# Patient Record
Sex: MALE | Race: WHITE
[De-identification: names, ages, dates, MRNs, and addresses within clinical notes are randomized per-mention and may not be internally consistent; named-entity substitution may affect disease eponyms.]

---

## 2018-01-24 ENCOUNTER — HOSPITAL ENCOUNTER (EMERGENCY)
Dept: HOSPITAL 77 - KA.ED | Age: 78
Discharge: HOME | End: 2018-01-24
Payer: OTHER GOVERNMENT

## 2018-01-24 DIAGNOSIS — K59.00: ICD-10-CM

## 2018-01-24 DIAGNOSIS — N30.00: Primary | ICD-10-CM

## 2018-01-24 DIAGNOSIS — Z79.899: ICD-10-CM

## 2018-01-24 LAB
CHLORIDE SERPL-SCNC: 98 MMOL/L (ref 98–115)
SODIUM SERPL-SCNC: 139 MMOL/L (ref 136–145)

## 2018-01-24 PROCEDURE — 85025 COMPLETE CBC W/AUTO DIFF WBC: CPT

## 2018-01-24 PROCEDURE — 99284 EMERGENCY DEPT VISIT MOD MDM: CPT

## 2018-01-24 PROCEDURE — 36415 COLL VENOUS BLD VENIPUNCTURE: CPT

## 2018-01-24 PROCEDURE — 80053 COMPREHEN METABOLIC PANEL: CPT

## 2018-01-24 PROCEDURE — 83690 ASSAY OF LIPASE: CPT

## 2018-01-24 PROCEDURE — 74018 RADEX ABDOMEN 1 VIEW: CPT

## 2018-01-24 PROCEDURE — 81001 URINALYSIS AUTO W/SCOPE: CPT

## 2018-01-24 NOTE — EDM.PDOC
ED HPI GENERAL MEDICAL PROBLEM





- General


Chief Complaint: Abdominal Pain


Stated Complaint: ABDOMINAL PAIN


Time Seen by Provider: 01/24/18 17:35


Source of Information: Reports: Patient


History Limitations: Reports: No Limitations





- History of Present Illness


INITIAL COMMENTS - FREE TEXT/NARRATIVE: 





76 YO WM presents to ER complaining of lower abdominal and low back pain x 2 

weeks. Pt reports he has been having constipation and anorexia as well. Pt with 

PMH of lymphoma and bladder CA. Pt finished chemo for lymphoma 8/2017. Pt 

reports he has been followed closely by oncology. Pt has had CT abd/pelvis 

imaging in the recent past which showed no abnormalities. Pt denies fever/chills

, no nausea/vomiting. Pt reports passing gas and has had recent BM of hard 

stool without blood. Pt denies dysuria, frequency or urgency. 


Onset Date: 01/10/18


Duration: Week(s): (2)


Location: Reports: Abdomen, Back


Quality: Reports: Ache


Severity: Mild


Improves with: Reports: None


Worsens with: Reports: None


Associated Symptoms: Reports: No Other Symptoms.  Denies: Cough, Fever/Chills, 

Nausea/Vomiting, Shortness of Breath


  ** Lower Abdominal


Pain Score (Numeric/FACES): 10





- Related Data


 Allergies











Allergy/AdvReac Type Severity Reaction Status Date / Time


 


No Known Drug Allergies Allergy  Cannot Verified 01/24/18 17:21





   Remember  











Home Meds: 


 Home Meds





Aspirin [Children's Aspirin] 81 mg PO BEDTIME 01/24/18 [History]


Cephalexin [Keflex] 250 mg PO Q6H #40 cap 01/24/18 [Rx]


Phenazopyridine [Pyridium] 200 mg PO TID PRN #6 tablet 01/24/18 [Rx]


Polyethylene Glycol 3350 [MiraLAX] 17 gm PO DAILY #30 packet 01/24/18 [Rx]


Simvastatin [Zocor] 10 mg PO BEDTIME 01/24/18 [History]











ED ROS GENERAL





- Review of Systems


Review Of Systems: See Below


Constitutional: Reports: Weight Loss


HEENT: Reports: No Symptoms


Respiratory: Reports: No Symptoms


Cardiovascular: Reports: No Symptoms


Endocrine: Reports: No Symptoms


GI/Abdominal: Reports: Abdominal Pain, Constipation, Decreased Appetite, Flatus


: Reports: No Symptoms, Frequency


Musculoskeletal: Reports: No Symptoms


Skin: Reports: No Symptoms


Neurological: Reports: No Symptoms


Psychiatric: Reports: No Symptoms


Hematologic/Lymphatic: Reports: No Symptoms


Immunologic: Reports: No Symptoms





ED EXAM, GI/ABD





- Physical Exam


Exam: See Below


Exam Limited By: No Limitations


General Appearance: Alert, WD/WN, No Apparent Distress


Head: Atraumatic, Normocephalic


Neck: Normal Inspection, Supple, Non-Tender, Full Range of Motion


Respiratory/Chest: No Respiratory Distress, Lungs Clear, Normal Breath Sounds, 

No Accessory Muscle Use, Chest Non-Tender


Cardiovascular: Normal Peripheral Pulses, Regular Rate, Rhythm, No Edema, No 

Gallop, No JVD, No Murmur, No Rub


GI/Abdominal Exam: Normal Bowel Sounds, Soft, Non-Tender, No Organomegaly, No 

Distention, No Abnormal Bruit, No Mass, Pelvis Stable


Back Exam: Normal Inspection, Full Range of Motion, NT


Extremities: Normal Inspection, Normal Range of Motion, Non-Tender, Normal 

Capillary Refill, No Pedal Edema


Neurological: Alert, Oriented, CN II-XII Intact, Normal Cognition, Normal Gait, 

Normal Reflexes, No Motor/Sensory Deficits


Psychiatric: Normal Affect, Normal Mood


Skin Exam: Warm, Dry, Intact, Normal Color, No Rash


Lymphatic: No Adenopathy





Course





- Vital Signs


Last Recorded V/S: 


 Last Vital Signs











Temp  36.2 C   01/24/18 17:18


 


Pulse  106 H  01/24/18 17:18


 


Resp  20   01/24/18 17:18


 


BP  120/71   01/24/18 17:18


 


Pulse Ox  94 L  01/24/18 17:18














- Orders/Labs/Meds


Orders: 


 Active Orders 24 hr











 Category Date Time Status


 


 Abdomen 1V Flat [CR] Stat Exams  01/24/18 17:28 Ordered


 


 Sodium Chloride 0.9% [Normal Saline] 500 ml Med  01/24/18 17:30 Active





 IV .BOLUS   








 Medication Orders





Sodium Chloride (Normal Saline)  500 mls @ 999 mls/hr IV .BOLUS BANDAR








Labs: 


 Laboratory Tests











  01/24/18 01/24/18 01/24/18 Range/Units





  17:40 17:40 18:09 


 


WBC  6.1    (5.0-10.0)  10^3/uL


 


RBC  4.17 L    (4.50-6.00)  10^6/uL


 


Hgb  12.3 L    (13.0-17.0)  g/dL


 


Hct  39.8 L    (40.0-52.0)  %


 


MCV  95.5 H    (82.0-92.0)  fL


 


MCH  29.5    (27.0-31.0)  pg


 


MCHC  30.9 L    (32.0-36.0)  g/dL


 


RDW  16.0 H    (11.5-14.5)  %


 


Plt Count  206    (150-300)  10^3/uL


 


MPV  7.2 L    (7.4-10.4)  fL


 


Neut % (Auto)  80.5 H    (50.0-70.0)  %


 


Lymph % (Auto)  6.7 L    (20.0-40.0)  %


 


Mono % (Auto)  12.1 H    (2.0-8.0)  %


 


Eos % (Auto)  0.7 L    (1.0-3.0)  %


 


Baso % (Auto)  0.0    (0.0-1.0)  %


 


Neut # (Auto)  5.0    (2.5-7.0)  10^3/uL


 


Lymph # (Auto)  0.4 L    (1.0-4.0)  10^3/uL


 


Mono # (Auto)  0.7    (0.1-0.8)  10^3/uL


 


Eos # (Auto)  0.0 L    (0.1-0.3)  10^3/uL


 


Baso # (Auto)  0.0    (0.0-0.1)  10^3/uL


 


Sodium   139   (136-145)  mmol/L


 


Potassium   4.2   (3.3-5.3)  mmol/L


 


Chloride   98   ()  mmol/L


 


Carbon Dioxide   33.3 H   (21.0-32.0)  mmol/L


 


BUN   21   (6-25)  mg/dL


 


Creatinine   1.08   (0.51-1.17)  mg/dL


 


Est Cr Clr Drug Dosing   36.75   mL/min


 


Estimated GFR (MDRD)   > 60   mL/min


 


Glucose   128 H   ()  mg/dL


 


Calcium   9.6   (8.7-10.3)  mg/dL


 


Total Bilirubin   0.4   (0.2-1.0)  mg/dL


 


AST   23   (15-37)  U/L


 


ALT   18   (12-78)  U/L


 


Alkaline Phosphatase   110   ()  IU/L


 


Total Protein   7.3   (6.4-8.2)  g/dL


 


Albumin   2.86 L   (3.00-4.80)  g/dL


 


Lipase   133   ()  U/L


 


Specimen Type    Urinvoid  


 


Urine Color    Yellow  (YELLOW)  


 


Urine Appearance    Clear  (CLEAR)  


 


Urine pH    5.5  (5.0-9.0)  


 


Ur Specific Gravity    1.015  (1.005-1.030)  


 


Urine Protein    Negative  (NEGATIVE)  mg/dL


 


Urine Glucose (UA)    Negative  (NEGATIVE)  mg/dL


 


Urine Ketones    Negative  (NEGATIVE)  mg/dL


 


Urine Occult Blood    Trace-lysed H  (NEGATIVE)  


 


Urine Nitrite    Negative  (NEGATIVE)  


 


Urine Bilirubin    Negative  (NEGATIVE)  


 


Urine Urobilinogen    0.2  (0.2-1.0)  E.U./dL


 


Ur Leukocyte Esterase    Trace H  (NEGATIVE)  


 


Urine RBC    0-5  /HPF


 


Urine WBC    20-30 H  /HPF


 


Ur Epithelial Cells    Few  /LPF


 


Urine Bacteria    Few  (NONE TO FEW)  /HPF











Meds: 


Medications











Generic Name Dose Route Start Last Admin





  Trade Name Freq  PRN Reason Stop Dose Admin


 


Sodium Chloride  500 mls @ 999 mls/hr  01/24/18 17:30  





  Normal Saline  IV   





  .BOLUS BANDAR   














- Radiology Interpretation


Free Text/Narrative:: 





1. KUB- nonspecific bowel gas pattern; NAD





Departure





- Departure


Time of Disposition: 18:47


Disposition: Home, Self-Care 01


Condition: Good


Clinical Impression: 


Abdominal pain


Qualifiers:


 Abdominal location: lower abdomen, unspecified Qualified Code(s): R10.30 - 

Lower abdominal pain, unspecified





Constipation


Qualifiers:


 Constipation type: unspecified constipation type Qualified Code(s): K59.00 - 

Constipation, unspecified





Urinary tract infection


Qualifiers:


 Urinary tract infection type: acute cystitis Hematuria presence: without 

hematuria Qualified Code(s): N30.00 - Acute cystitis without hematuria








- Discharge Information


Prescriptions: 


Cephalexin [Keflex] 250 mg PO Q6H #40 cap


Phenazopyridine [Pyridium] 200 mg PO TID PRN #6 tablet


 PRN Reason: Pain


Polyethylene Glycol 3350 [MiraLAX] 17 gm PO DAILY #30 packet


Instructions:  Constipation, Adult, Easy-to-Read, Abdominal Pain, Adult, Easy-to

-Read, Urinary Tract Infection, Adult


Referrals: 


PCP,Not In Area [Primary Care Provider] - 


Forms:  ED Department Discharge





- My Orders


Last 24 Hours: 


My Active Orders





01/24/18 17:28


Abdomen 1V Flat [CR] Stat 





01/24/18 17:30


Sodium Chloride 0.9% [Normal Saline] 500 ml IV .BOLUS 














- Assessment/Plan


Last 24 Hours: 


My Active Orders





01/24/18 17:28


Abdomen 1V Flat [CR] Stat 





01/24/18 17:30


Sodium Chloride 0.9% [Normal Saline] 500 ml IV .BOLUS 











Assessment:: 





1. UTI


2. constipation


Plan: 





1. Keflex 500mg PO Q6 x 10 days


2. magnesium citrate PRN


3. miralax QD


4. follow up with PCP for recheck


5. return to ER for worsening symptoms

## 2018-02-05 ENCOUNTER — HOSPITAL ENCOUNTER (EMERGENCY)
Dept: HOSPITAL 77 - KA.ED | Age: 78
Discharge: HOME | End: 2018-02-05
Payer: OTHER GOVERNMENT

## 2018-02-05 DIAGNOSIS — R10.30: Primary | ICD-10-CM

## 2018-02-05 DIAGNOSIS — Z87.891: ICD-10-CM

## 2018-02-05 DIAGNOSIS — C85.96: ICD-10-CM

## 2018-02-05 LAB
CHLORIDE SERPL-SCNC: 100 MMOL/L (ref 98–115)
SODIUM SERPL-SCNC: 142 MMOL/L (ref 136–145)

## 2018-02-05 NOTE — EDM.PDOC
ED HPI GENERAL MEDICAL PROBLEM





- General


Chief Complaint: General


Stated Complaint: back pain


Time Seen by Provider: 02/05/18 18:18


Source of Information: Reports: Patient


History Limitations: Reports: No Limitations





- History of Present Illness


INITIAL COMMENTS - FREE TEXT/NARRATIVE: 





Patient is a 77-year-old gentleman who presents to the emergency department 

with a complaint of lower abdominal pain.  Pain is been going on for several 

weeks.  [facility] Patient 

was seen here on 01/24/2018 for similar symptoms and found to have a urinary 

tract infection.  Patient does have a history of bladder cancer.  Patient 

denies fever, cough, shortness of breath, chest pain, or any trauma.


Onset: Gradual


Duration: Week(s):


Location: Reports: Abdomen


Quality: Reports: Ache, Other (Cramping)


Improves with: Reports: None


Worsens with: Reports: None


Associated Symptoms: Reports: Loss of Appetite, Malaise.  Denies: Chest Pain, 

Cough, Nausea/Vomiting, Shortness of Breath


  ** Lower Back


Pain Score (Numeric/FACES): 7





- Related Data


 Allergies











Allergy/AdvReac Type Severity Reaction Status Date / Time


 


No Known Drug Allergies Allergy  Cannot Verified 01/24/18 17:21





   Remember  











Home Meds: 


 Home Meds





Aspirin [Children's Aspirin] 81 mg PO BEDTIME 01/24/18 [History]


Simvastatin [Zocor] 10 mg PO BEDTIME 01/24/18 [History]











Social & Family History





- Tobacco Use


Smoking Status *Q: Former Smoker


Years of Tobacco use: 60


Packs/Tins Daily: 1


Used Tobacco, but Quit: Yes


Month Tobacco Last Used: jan 2 2018





- Caffeine Use


Caffeine Use: Reports: Coffee, Soda, Tea





- Recreational Drug Use


Recreational Drug Use: No





ED ROS GENERAL





- Review of Systems


Review Of Systems: ROS reveals no pertinent complaints other than HPI.


Constitutional: Reports: Malaise, Decreased Appetite


HEENT: Reports: No Symptoms


Respiratory: Reports: No Symptoms


Cardiovascular: Reports: No Symptoms


Endocrine: Reports: No Symptoms


GI/Abdominal: Reports: Abdominal Pain


: Reports: Frequency


Musculoskeletal: Reports: No Symptoms


Skin: Reports: No Symptoms


Neurological: Reports: No Symptoms


Psychiatric: Reports: No Symptoms


Hematologic/Lymphatic: Reports: No Symptoms


Immunologic: Reports: No Symptoms





ED EXAM, GENERAL





- Physical Exam


Exam: See Below


Exam Limited By: No Limitations


General Appearance: Alert, WD/WN, No Apparent Distress, Thin


Eye Exam: Bilateral Eye: Normal Inspection


Nose: Normal Inspection, Normal Mucosa, No Blood


Throat/Mouth: Normal Inspection, Normal Oropharynx, No Airway Compromise


Head: Atraumatic, Normocephalic


Neck: Normal Inspection


Respiratory/Chest: No Respiratory Distress, Lungs Clear, Normal Breath Sounds, 

No Accessory Muscle Use, Chest Non-Tender


Cardiovascular: Regular Rate, Rhythm, No Murmur


GI/Abdominal: Normal Bowel Sounds, Soft, Tender (Suprapubic)


 (Male) Exam: Normal Inspection


Back Exam: Normal Inspection.  No: CVA Tenderness (L), CVA Tenderness (R)


Extremities: Normal Inspection, Non-Tender, No Pedal Edema


Neurological: Alert, Oriented, Normal Cognition


Psychiatric: Normal Affect, Normal Mood


Skin Exam: Warm, Dry, Intact, Normal Color, No Rash


Lymphatic: No Adenopathy





Course





- Orders/Labs/Meds


Orders: 


 Active Orders 24 hr











 Category Date Time Status


 


 Peripheral IV Care [RC] .AS DIRECTED Care  02/05/18 18:05 Active


 


 Abdomen Pelvis w Cont [CT] Stat Exams  02/05/18 18:04 Ordered


 


 Sodium Chloride 0.9% [Syrex Flush] Med  02/05/18 18:05 Active





 5 ml FLUSH Q8HR PRN   


 


 Peripheral IV Insertion Adult [OM.PC] Routine Oth  02/05/18 18:05 Ordered








 Medication Orders





Sodium Chloride (Syrex Flush)  5 ml FLUSH Q8HR PRN


   PRN Reason: Keep Vein Open








Labs: 


 Laboratory Tests











  02/05/18 02/05/18 02/05/18 Range/Units





  17:55 17:55 18:55 


 


WBC   5.5   (5.0-10.0)  10^3/uL


 


RBC   3.70 L   (4.50-6.00)  10^6/uL


 


Hgb   11.0 L   (13.0-17.0)  g/dL


 


Hct   34.0 L   (40.0-52.0)  %


 


MCV   91.9   (82.0-92.0)  fL


 


MCH   29.8   (27.0-31.0)  pg


 


MCHC   32.4   (32.0-36.0)  g/dL


 


RDW   14.4   (11.5-14.5)  %


 


Plt Count   349 H   (150-300)  10^3/uL


 


MPV   7.6   (7.4-10.4)  fL


 


Neut % (Auto)   77.1 H   (50.0-70.0)  %


 


Lymph % (Auto)   6.6 L   (20.0-40.0)  %


 


Mono % (Auto)   15.3 H   (2.0-8.0)  %


 


Eos % (Auto)   0.2 L   (1.0-3.0)  %


 


Baso % (Auto)   0.8   (0.0-1.0)  %


 


Neut # (Auto)   4.3   (2.5-7.0)  10^3/uL


 


Lymph # (Auto)   0.4 L   (1.0-4.0)  10^3/uL


 


Mono # (Auto)   0.8   (0.1-0.8)  10^3/uL


 


Eos # (Auto)   0.0 L   (0.1-0.3)  10^3/uL


 


Baso # (Auto)   0.0   (0.0-0.1)  10^3/uL


 


Sodium  142    (136-145)  mmol/L


 


Potassium  3.7    (3.3-5.3)  mmol/L


 


Chloride  100    ()  mmol/L


 


Carbon Dioxide  33.3 H    (21.0-32.0)  mmol/L


 


BUN  21    (6-25)  mg/dL


 


Creatinine  1.04    (0.51-1.17)  mg/dL


 


Est Cr Clr Drug Dosing  TNP    


 


Estimated GFR (MDRD)  > 60    mL/min


 


Glucose  162 H    ()  mg/dL


 


Calcium  9.3    (8.7-10.3)  mg/dL


 


Total Bilirubin  0.2    (0.2-1.0)  mg/dL


 


AST  29    (15-37)  U/L


 


ALT  20    (12-78)  U/L


 


Alkaline Phosphatase  103    ()  IU/L


 


Total Protein  6.9    (6.4-8.2)  g/dL


 


Albumin  2.68 L    (3.00-4.80)  g/dL


 


Specimen Type    Urinvoid  


 


Urine Color    Yellow  (YELLOW)  


 


Urine Appearance    Clear  (CLEAR)  


 


Urine pH    6.5  (5.0-9.0)  


 


Ur Specific Gravity    1.020  (1.005-1.030)  


 


Urine Protein    Trace H  (NEGATIVE)  mg/dL


 


Urine Glucose (UA)    Negative  (NEGATIVE)  mg/dL


 


Urine Ketones    Negative  (NEGATIVE)  mg/dL


 


Urine Occult Blood    Negative  (NEGATIVE)  


 


Urine Nitrite    Negative  (NEGATIVE)  


 


Urine Bilirubin    Negative  (NEGATIVE)  


 


Urine Urobilinogen    0.2  (0.2-1.0)  E.U./dL


 


Ur Leukocyte Esterase    Negative  (NEGATIVE)  


 


Urine RBC    0-5  /HPF


 


Urine WBC    0-5  /HPF


 


Ur Epithelial Cells    Few  /LPF


 


Urine Bacteria    Few  (NONE TO FEW)  /HPF











Meds: 


Medications











Generic Name Dose Route Start Last Admin





  Trade Name Freq  PRN Reason Stop Dose Admin


 


Sodium Chloride  5 ml  02/05/18 18:05  





  Syrex Flush  FLUSH   





  Q8HR PRN   





  Keep Vein Open   














Discontinued Medications














Generic Name Dose Route Start Last Admin





  Trade Name Freq  PRN Reason Stop Dose Admin


 


Sodium Chloride  1,000 mls @ 999 mls/hr  02/05/18 18:05  





  Normal Saline  IV  02/05/18 19:05  





  .BOLUS ONE   














- Radiology Interpretation


Free Text/Narrative:: 





CT abdomen and pelvis shows retroperitoneal adenopathy, nonspecific bilateral 

lower lobe lung nodules, no evidence of obstruction.  These findings are not of 

an acute nature.


CT Results Date: 02/05/18





- Re-Assessments/Exams


Free Text/Narrative Re-Assessment/Exam: 





02/05/18 19:55


Patient afebrile, nontoxic appearing, vital signs stable.  Pain subsided.  

[facility].





Departure





- Departure


Time of Disposition: 19:56


Disposition: Home, Self-Care 01


Condition: Good


Clinical Impression: 


Abdominal pain


Qualifiers:


 Abdominal location: lower abdomen, unspecified Qualified Code(s): R10.30 - 

Lower abdominal pain, unspecified





Lymphoma


Qualifiers:


 Lymphoma type: unspecified type Lymphoma site: intrapelvic nodes Qualified Code

(s): C85.96 - Non-Hodgkin lymphoma, unspecified, intrapelvic lymph nodes








- Discharge Information


Instructions:  Hodgkin Lymphoma, Adult, Malnutrition, Abdominal Pain, Adult, 

Easy-to-Read


Forms:  ED Department Discharge


Additional Instructions: 


[facility].  Return to the emergency department sooner if 

symptoms continue or worsen.





- My Orders


Last 24 Hours: 


My Active Orders





02/05/18 18:04


Abdomen Pelvis w Cont [CT] Stat 





02/05/18 18:05


Peripheral IV Care [RC] .AS DIRECTED 


Sodium Chloride 0.9% [Syrex Flush]   5 ml FLUSH Q8HR PRN 


Peripheral IV Insertion Adult [OM.PC] Routine 














- Assessment/Plan


Last 24 Hours: 


My Active Orders





02/05/18 18:04


Abdomen Pelvis w Cont [CT] Stat 





02/05/18 18:05


Peripheral IV Care [RC] .AS DIRECTED 


Sodium Chloride 0.9% [Syrex Flush]   5 ml FLUSH Q8HR PRN 


Peripheral IV Insertion Adult [OM.PC] Routine 











Assessment:: 





Abdominal pain


Plan: 





[facility]